# Patient Record
Sex: MALE | Race: WHITE
[De-identification: names, ages, dates, MRNs, and addresses within clinical notes are randomized per-mention and may not be internally consistent; named-entity substitution may affect disease eponyms.]

---

## 2019-12-19 ENCOUNTER — HOSPITAL ENCOUNTER (OUTPATIENT)
Dept: HOSPITAL 95 - ER | Age: 79
Setting detail: OBSERVATION
LOS: 1 days | Discharge: HOME | End: 2019-12-20
Attending: HOSPITALIST | Admitting: HOSPITALIST
Payer: MEDICARE

## 2019-12-19 VITALS — HEIGHT: 70 IN | WEIGHT: 236.78 LBS | BODY MASS INDEX: 33.9 KG/M2

## 2019-12-19 DIAGNOSIS — K21.9: ICD-10-CM

## 2019-12-19 DIAGNOSIS — Z87.891: ICD-10-CM

## 2019-12-19 DIAGNOSIS — Z90.49: ICD-10-CM

## 2019-12-19 DIAGNOSIS — Z96.653: ICD-10-CM

## 2019-12-19 DIAGNOSIS — N18.3: ICD-10-CM

## 2019-12-19 DIAGNOSIS — Z79.899: ICD-10-CM

## 2019-12-19 DIAGNOSIS — E78.5: ICD-10-CM

## 2019-12-19 DIAGNOSIS — I48.92: ICD-10-CM

## 2019-12-19 DIAGNOSIS — I50.32: ICD-10-CM

## 2019-12-19 DIAGNOSIS — R07.89: Primary | ICD-10-CM

## 2019-12-19 LAB
ALBUMIN SERPL BCP-MCNC: 3.8 G/DL (ref 3.4–5)
ALBUMIN/GLOB SERPL: 1 {RATIO} (ref 0.8–1.8)
ALT SERPL W P-5'-P-CCNC: 31 U/L (ref 12–78)
ANION GAP SERPL CALCULATED.4IONS-SCNC: 6 MMOL/L (ref 6–16)
AST SERPL W P-5'-P-CCNC: 27 U/L (ref 12–37)
BASOPHILS # BLD AUTO: 0.04 K/MM3 (ref 0–0.23)
BASOPHILS NFR BLD AUTO: 1 % (ref 0–2)
BILIRUB SERPL-MCNC: 0.9 MG/DL (ref 0.1–1)
BUN SERPL-MCNC: 32 MG/DL (ref 8–24)
CALCIUM SERPL-MCNC: 9.3 MG/DL (ref 8.5–10.1)
CHLORIDE SERPL-SCNC: 107 MMOL/L (ref 98–108)
CO2 SERPL-SCNC: 27 MMOL/L (ref 21–32)
CREAT SERPL-MCNC: 1.68 MG/DL (ref 0.6–1.2)
DEPRECATED RDW RBC AUTO: 50.5 FL (ref 35.1–46.3)
EOSINOPHIL # BLD AUTO: 0.31 K/MM3 (ref 0–0.68)
EOSINOPHIL NFR BLD AUTO: 5 % (ref 0–6)
ERYTHROCYTE [DISTWIDTH] IN BLOOD BY AUTOMATED COUNT: 15.7 % (ref 11.7–14.2)
GLOBULIN SER CALC-MCNC: 3.8 G/DL (ref 2.2–4)
GLUCOSE SERPL-MCNC: 96 MG/DL (ref 70–99)
HCT VFR BLD AUTO: 49.2 % (ref 37–53)
HGB BLD-MCNC: 15.5 G/DL (ref 13.5–17.5)
IMM GRANULOCYTES # BLD AUTO: 0.02 K/MM3 (ref 0–0.1)
IMM GRANULOCYTES NFR BLD AUTO: 0 % (ref 0–1)
LYMPHOCYTES # BLD AUTO: 1.09 K/MM3 (ref 0.84–5.2)
LYMPHOCYTES NFR BLD AUTO: 16 % (ref 21–46)
MCHC RBC AUTO-ENTMCNC: 31.5 G/DL (ref 31.5–36.5)
MCV RBC AUTO: 88 FL (ref 80–100)
MONOCYTES # BLD AUTO: 0.55 K/MM3 (ref 0.16–1.47)
MONOCYTES NFR BLD AUTO: 8 % (ref 4–13)
NEUTROPHILS # BLD AUTO: 4.85 K/MM3 (ref 1.96–9.15)
NEUTROPHILS NFR BLD AUTO: 71 % (ref 41–73)
NRBC # BLD AUTO: 0 K/MM3 (ref 0–0.02)
NRBC BLD AUTO-RTO: 0 /100 WBC (ref 0–0.2)
PLATELET # BLD AUTO: 154 K/MM3 (ref 150–400)
POTASSIUM SERPL-SCNC: 4.9 MMOL/L (ref 3.5–5.5)
PROT SERPL-MCNC: 7.6 G/DL (ref 6.4–8.2)
SODIUM SERPL-SCNC: 140 MMOL/L (ref 136–145)
TROPONIN I SERPL-MCNC: 0.02 NG/ML (ref 0–0.04)

## 2019-12-19 PROCEDURE — G0378 HOSPITAL OBSERVATION PER HR: HCPCS

## 2019-12-19 NOTE — NUR
1930 PT RESTING COMFORTABLY IN BED; DENIES CHEST PAIN OR SOB; ALERT AND
ORIENTED X 4; PT TAUGHT TO CALL THIS NURSE ANY CHEST PAIN, HEADACHE, SOB OR
ANYTHING JUST DOESN'T FEEL RIGHT ASAP WITH ACKNOWLEDGEMENT NOTED.

## 2019-12-19 NOTE — NUR
PT ADMITTED TODAY FOR CP. PATIENT HAS NO COMPLAINTS OF PAIN, SOB, OR NV. HE IS
A/O X 4 AND INDEPENDENT IN THE ROOM. HE IS ABLE TO EXPRESS ANY NEEDS. TELE
REPORTS FREQUENT PVC'S , RIGHT BBB, AND PAC'S. DOCTOR NOTIFIED. PATIENT IS IN
NO DISTRESS. HE IS RESTING IN BED AT THIS TIME.

## 2019-12-20 NOTE — NUR
PT TO DISCHARGE HOME. NO NEW MEDS. NURSE INSTRUCTED PATIENT AND WIFE TO FOLLOW
UP WITH PCP. PATIENT HAD IV REMOVED, NO SS OF INFECTION NOTED. PATIENT TO
DRESS SELF AND WILL BE WALKED OUT BY STAFF.

## 2019-12-20 NOTE — NUR
SHIFT SUMMARY:
80 Y/O MALE RESTED COMFORTABLY ALL SHIFT; DENIES CHEST PAIN, NAUSEA OR
DYSPNEA; TELEMETRY REFLECTS NSR WITH OCCASIONAL PVC/PAC WITH HEART RATE 66 PER
SHYAM--TELE TECH; ALERT AND ORIENTED X 4; LAST TROPONIN .037 AT 0137; BED
LOW POSITION WITH CALL LIGHT AT SIDE.

## 2021-08-03 ENCOUNTER — HOSPITAL ENCOUNTER (EMERGENCY)
Dept: HOSPITAL 95 - ER | Age: 81
Discharge: HOME | End: 2021-08-03
Payer: MEDICARE

## 2021-08-03 VITALS — BODY MASS INDEX: 32.21 KG/M2 | WEIGHT: 225 LBS | HEIGHT: 70 IN

## 2021-08-03 DIAGNOSIS — I48.92: ICD-10-CM

## 2021-08-03 DIAGNOSIS — I49.3: ICD-10-CM

## 2021-08-03 DIAGNOSIS — Z87.891: ICD-10-CM

## 2021-08-03 DIAGNOSIS — E78.5: ICD-10-CM

## 2021-08-03 DIAGNOSIS — R79.1: Primary | ICD-10-CM

## 2021-08-03 LAB
ALBUMIN SERPL BCP-MCNC: 3.6 G/DL (ref 3.4–5)
ALBUMIN/GLOB SERPL: 0.9 {RATIO} (ref 0.8–1.8)
ALT SERPL W P-5'-P-CCNC: 28 U/L (ref 12–78)
ANION GAP SERPL CALCULATED.4IONS-SCNC: 4 MMOL/L (ref 6–16)
AST SERPL W P-5'-P-CCNC: 27 U/L (ref 12–37)
BASOPHILS # BLD AUTO: 0.06 K/MM3 (ref 0–0.23)
BASOPHILS NFR BLD AUTO: 1 % (ref 0–2)
BILIRUB SERPL-MCNC: 1.1 MG/DL (ref 0.1–1)
BUN SERPL-MCNC: 20 MG/DL (ref 8–24)
CALCIUM SERPL-MCNC: 9 MG/DL (ref 8.5–10.1)
CHLORIDE SERPL-SCNC: 107 MMOL/L (ref 98–108)
CO2 SERPL-SCNC: 28 MMOL/L (ref 21–32)
CREAT SERPL-MCNC: 1.83 MG/DL (ref 0.6–1.2)
DEPRECATED RDW RBC AUTO: 60 FL (ref 35.1–46.3)
EOSINOPHIL # BLD AUTO: 0.21 K/MM3 (ref 0–0.68)
EOSINOPHIL NFR BLD AUTO: 4 % (ref 0–6)
ERYTHROCYTE [DISTWIDTH] IN BLOOD BY AUTOMATED COUNT: 20 % (ref 11.7–14.2)
GLOBULIN SER CALC-MCNC: 3.9 G/DL (ref 2.2–4)
GLUCOSE SERPL-MCNC: 87 MG/DL (ref 70–99)
HCT VFR BLD AUTO: 45.3 % (ref 37–53)
HGB BLD-MCNC: 13.9 G/DL (ref 13.5–17.5)
IMM GRANULOCYTES # BLD AUTO: 0.01 K/MM3 (ref 0–0.1)
IMM GRANULOCYTES NFR BLD AUTO: 0 % (ref 0–1)
LYMPHOCYTES # BLD AUTO: 1.24 K/MM3 (ref 0.84–5.2)
LYMPHOCYTES NFR BLD AUTO: 21 % (ref 21–46)
MAGNESIUM SERPL-MCNC: 2.1 MG/DL (ref 1.6–2.4)
MCHC RBC AUTO-ENTMCNC: 30.7 G/DL (ref 31.5–36.5)
MCV RBC AUTO: 84 FL (ref 80–100)
MONOCYTES # BLD AUTO: 0.58 K/MM3 (ref 0.16–1.47)
MONOCYTES NFR BLD AUTO: 10 % (ref 4–13)
NEUTROPHILS # BLD AUTO: 3.8 K/MM3 (ref 1.96–9.15)
NEUTROPHILS NFR BLD AUTO: 64 % (ref 41–73)
NRBC # BLD AUTO: 0 K/MM3 (ref 0–0.02)
NRBC BLD AUTO-RTO: 0 /100 WBC (ref 0–0.2)
PHOSPHATE SERPL-MCNC: 3.6 MG/DL (ref 2.5–4.9)
PLATELET # BLD AUTO: 163 K/MM3 (ref 150–400)
POTASSIUM SERPL-SCNC: 4.3 MMOL/L (ref 3.5–5.5)
PROT SERPL-MCNC: 7.5 G/DL (ref 6.4–8.2)
PROTHROMBIN TIME: >90 SEC (ref 9.7–11.5)
SODIUM SERPL-SCNC: 139 MMOL/L (ref 136–145)
TSH SERPL DL<=0.005 MIU/L-ACNC: 3.17 UIU/ML (ref 0.36–4.8)

## 2021-08-03 PROCEDURE — A9270 NON-COVERED ITEM OR SERVICE: HCPCS

## 2022-04-12 ENCOUNTER — HOSPITAL ENCOUNTER (OUTPATIENT)
Dept: HOSPITAL 95 - ORSCSDS | Age: 82
Discharge: HOME | End: 2022-04-12
Attending: OPHTHALMOLOGY
Payer: MEDICARE

## 2022-04-12 VITALS — WEIGHT: 223.11 LBS | HEIGHT: 70 IN | BODY MASS INDEX: 31.94 KG/M2

## 2022-04-12 DIAGNOSIS — E07.9: ICD-10-CM

## 2022-04-12 DIAGNOSIS — Z79.01: ICD-10-CM

## 2022-04-12 DIAGNOSIS — I12.9: ICD-10-CM

## 2022-04-12 DIAGNOSIS — Z79.899: ICD-10-CM

## 2022-04-12 DIAGNOSIS — N18.9: ICD-10-CM

## 2022-04-12 DIAGNOSIS — Z79.82: ICD-10-CM

## 2022-04-12 DIAGNOSIS — H25.11: Primary | ICD-10-CM

## 2022-04-12 PROCEDURE — V2632 POST CHMBR INTRAOCULAR LENS: HCPCS

## 2022-04-12 PROCEDURE — 08RJ3JZ REPLACEMENT OF RIGHT LENS WITH SYNTHETIC SUBSTITUTE, PERCUTANEOUS APPROACH: ICD-10-PCS | Performed by: OPHTHALMOLOGY

## 2022-04-21 ENCOUNTER — HOSPITAL ENCOUNTER (OUTPATIENT)
Dept: HOSPITAL 95 - ORSCSDS | Age: 82
Discharge: HOME | End: 2022-04-21
Attending: OPHTHALMOLOGY
Payer: MEDICARE

## 2022-04-21 VITALS — BODY MASS INDEX: 32.16 KG/M2 | HEIGHT: 70 IN | WEIGHT: 224.65 LBS

## 2022-04-21 DIAGNOSIS — H25.12: Primary | ICD-10-CM

## 2022-04-21 DIAGNOSIS — E03.9: ICD-10-CM

## 2022-04-21 DIAGNOSIS — R06.02: ICD-10-CM

## 2022-04-21 DIAGNOSIS — K21.9: ICD-10-CM

## 2022-04-21 DIAGNOSIS — E66.9: ICD-10-CM

## 2022-04-21 DIAGNOSIS — Z79.899: ICD-10-CM

## 2022-04-21 DIAGNOSIS — I48.91: ICD-10-CM

## 2022-04-21 DIAGNOSIS — Z79.01: ICD-10-CM

## 2022-04-21 DIAGNOSIS — I10: ICD-10-CM

## 2022-04-21 PROCEDURE — 08RK3JZ REPLACEMENT OF LEFT LENS WITH SYNTHETIC SUBSTITUTE, PERCUTANEOUS APPROACH: ICD-10-PCS | Performed by: OPHTHALMOLOGY

## 2022-04-21 PROCEDURE — V2632 POST CHMBR INTRAOCULAR LENS: HCPCS

## 2023-01-03 ENCOUNTER — HOSPITAL ENCOUNTER (INPATIENT)
Dept: HOSPITAL 95 - ER | Age: 83
LOS: 13 days | Discharge: HOME HEALTH | DRG: 871 | End: 2023-01-16
Attending: HOSPITALIST | Admitting: HOSPITALIST
Payer: MEDICARE

## 2023-01-03 VITALS — WEIGHT: 199.74 LBS | HEIGHT: 70 IN | BODY MASS INDEX: 28.59 KG/M2

## 2023-01-03 DIAGNOSIS — E80.6: ICD-10-CM

## 2023-01-03 DIAGNOSIS — Z20.822: ICD-10-CM

## 2023-01-03 DIAGNOSIS — E86.0: ICD-10-CM

## 2023-01-03 DIAGNOSIS — E87.20: ICD-10-CM

## 2023-01-03 DIAGNOSIS — J96.01: ICD-10-CM

## 2023-01-03 DIAGNOSIS — R65.20: ICD-10-CM

## 2023-01-03 DIAGNOSIS — J10.01: ICD-10-CM

## 2023-01-03 DIAGNOSIS — G92.8: ICD-10-CM

## 2023-01-03 DIAGNOSIS — Z79.899: ICD-10-CM

## 2023-01-03 DIAGNOSIS — Z79.890: ICD-10-CM

## 2023-01-03 DIAGNOSIS — I48.92: ICD-10-CM

## 2023-01-03 DIAGNOSIS — E87.6: ICD-10-CM

## 2023-01-03 DIAGNOSIS — J69.0: ICD-10-CM

## 2023-01-03 DIAGNOSIS — N17.9: ICD-10-CM

## 2023-01-03 DIAGNOSIS — E03.9: ICD-10-CM

## 2023-01-03 DIAGNOSIS — E87.0: ICD-10-CM

## 2023-01-03 DIAGNOSIS — E78.5: ICD-10-CM

## 2023-01-03 DIAGNOSIS — K21.9: ICD-10-CM

## 2023-01-03 DIAGNOSIS — Z98.890: ICD-10-CM

## 2023-01-03 DIAGNOSIS — Z96.653: ICD-10-CM

## 2023-01-03 DIAGNOSIS — I50.32: ICD-10-CM

## 2023-01-03 DIAGNOSIS — Z79.01: ICD-10-CM

## 2023-01-03 DIAGNOSIS — A41.9: Primary | ICD-10-CM

## 2023-01-03 DIAGNOSIS — I21.A1: ICD-10-CM

## 2023-01-03 DIAGNOSIS — N18.31: ICD-10-CM

## 2023-01-03 LAB
ALBUMIN SERPL BCP-MCNC: 2.6 G/DL (ref 3.4–5)
ALBUMIN/GLOB SERPL: 0.5 {RATIO} (ref 0.8–1.8)
ALT SERPL W P-5'-P-CCNC: 51 U/L (ref 12–78)
ANION GAP SERPL CALCULATED.4IONS-SCNC: 12 MMOL/L (ref 6–16)
AST SERPL W P-5'-P-CCNC: 77 U/L (ref 12–37)
BASOPHILS # BLD: 0 K/MM3 (ref 0–0.23)
BASOPHILS NFR BLD: 0 % (ref 0–2)
BILIRUB DIRECT SERPL-MCNC: 2.2 MG/DL (ref 0–0.3)
BILIRUB INDIRECT SERPL-MCNC: 1.2 MG/DL (ref 0.1–0.7)
BILIRUB SERPL-MCNC: 3.4 MG/DL (ref 0.1–1)
BILIRUB SERPL-MCNC: 3.8 MG/DL (ref 0.1–1)
BUN SERPL-MCNC: 43 MG/DL (ref 8–24)
CALCIUM SERPL-MCNC: 9.6 MG/DL (ref 8.5–10.1)
CHLORIDE SERPL-SCNC: 95 MMOL/L (ref 98–108)
CO2 SERPL-SCNC: 24 MMOL/L (ref 21–32)
CREAT SERPL-MCNC: 1.61 MG/DL (ref 0.6–1.2)
DEPRECATED RDW RBC AUTO: 56.1 FL (ref 35.1–46.3)
EOSINOPHIL # BLD: 0 K/MM3 (ref 0–0.68)
EOSINOPHIL NFR BLD: 0 % (ref 0–6)
ERYTHROCYTE [DISTWIDTH] IN BLOOD BY AUTOMATED COUNT: 18.4 % (ref 11.7–14.2)
FLUAV RNA SPEC QL NAA+PROBE: POSITIVE
FLUBV RNA SPEC QL NAA+PROBE: NEGATIVE
GLOBULIN SER CALC-MCNC: 5.6 G/DL (ref 2.2–4)
GLUCOSE SERPL-MCNC: 117 MG/DL (ref 70–99)
HCT VFR BLD AUTO: 48.2 % (ref 37–53)
HGB BLD-MCNC: 15.8 G/DL (ref 13.5–17.5)
LYMPHOCYTES # BLD: 0.97 K/MM3 (ref 0.84–5.2)
LYMPHOCYTES NFR BLD: 15 % (ref 21–46)
MCHC RBC AUTO-ENTMCNC: 32.8 G/DL (ref 31.5–36.5)
MCV RBC AUTO: 86 FL (ref 80–100)
MONOCYTES # BLD: 0.39 K/MM3 (ref 0.16–1.47)
MONOCYTES NFR BLD: 6 % (ref 4–13)
NEUTS BAND NFR BLD MANUAL: 5 % (ref 0–8)
NEUTS SEG # BLD MANUAL: 5.15 K/MM3 (ref 1.96–9.15)
NEUTS SEG NFR BLD MANUAL: 74 % (ref 41–73)
NRBC # BLD AUTO: 0.02 K/MM3 (ref 0–0.02)
NRBC BLD AUTO-RTO: 0.3 /100 WBC (ref 0–0.2)
PH BLDA: 7.51 [PH] (ref 7.35–7.45)
PLATELET # BLD AUTO: 227 K/MM3 (ref 150–400)
POTASSIUM SERPL-SCNC: 3.8 MMOL/L (ref 3.5–5.5)
PROT SERPL-MCNC: 8.2 G/DL (ref 6.4–8.2)
PROTHROMBIN TIME: 17.6 SEC (ref 9.7–11.5)
RSV RNA SPEC QL NAA+PROBE: NEGATIVE
SARS-COV-2 RNA RESP QL NAA+PROBE: NEGATIVE
SODIUM SERPL-SCNC: 131 MMOL/L (ref 136–145)
TOTAL CELLS COUNTED BLD: 100

## 2023-01-03 PROCEDURE — A9270 NON-COVERED ITEM OR SERVICE: HCPCS

## 2023-01-03 NOTE — NUR
PHYSICIAN NOTIFIED
PT CONT TO ATTEMPT TO CLIMB OUT OF BED. CONFUSED AND PULLING AT LINES AND 02
TUBING. ORDERS FOR ONE TIME DOSE OF SEOQUEL 25 MG NOW.

## 2023-01-03 NOTE — NUR
ADDMISSION SUMMARY
 
PT HAS BEEN RESTING IN BED SINCE ARRIVAL. PT ARRIVED TO THE DEPARTMENT BY
GURNEY, THEY WERE TRANSFERRED BY A MODERATE 2-PERSON PIVOT TRANSFER, THEY WERE
UNSTEADY ON THEIR FEET AND DID NOT FOLLOW INSTRUCTION WELL. PT IS ORIENTED TO
SELF ONLY, MULTIPLE ATTEMPTS TO REORIENT WERE MADE UNSUCCESSFULLY. PT WAS
PLEASANT AND COOPERATIVE, THEY ARE BEING VISUALLY MONITORED ON CENTRAL
MONITORING. NORMAL SALINE BOLUS WAS COMPLETED AND MAINTENANCE IV FLUIDS WERE
STARTED.

## 2023-01-04 LAB
ANION GAP SERPL CALCULATED.4IONS-SCNC: 7 MMOL/L (ref 6–16)
BUN SERPL-MCNC: 38 MG/DL (ref 8–24)
CALCIUM SERPL-MCNC: 8.6 MG/DL (ref 8.5–10.1)
CHLORIDE SERPL-SCNC: 102 MMOL/L (ref 98–108)
CO2 SERPL-SCNC: 27 MMOL/L (ref 21–32)
CREAT SERPL-MCNC: 1.27 MG/DL (ref 0.6–1.2)
DEPRECATED RDW RBC AUTO: 54.4 FL (ref 35.1–46.3)
ERYTHROCYTE [DISTWIDTH] IN BLOOD BY AUTOMATED COUNT: 17.4 % (ref 11.7–14.2)
GLUCOSE SERPL-MCNC: 91 MG/DL (ref 70–99)
HCT VFR BLD AUTO: 42.7 % (ref 37–53)
HGB BLD-MCNC: 14 G/DL (ref 13.5–17.5)
MAGNESIUM SERPL-MCNC: 2.2 MG/DL (ref 1.6–2.4)
MCHC RBC AUTO-ENTMCNC: 32.8 G/DL (ref 31.5–36.5)
MCV RBC AUTO: 85 FL (ref 80–100)
NRBC # BLD AUTO: 0 K/MM3 (ref 0–0.02)
NRBC BLD AUTO-RTO: 0 /100 WBC (ref 0–0.2)
PLATELET # BLD AUTO: 169 K/MM3 (ref 150–400)
POTASSIUM SERPL-SCNC: 4.3 MMOL/L (ref 3.5–5.5)
PROTHROMBIN TIME: 20.1 SEC (ref 9.7–11.5)
SODIUM SERPL-SCNC: 136 MMOL/L (ref 136–145)

## 2023-01-04 NOTE — NUR
PT SUMMARY:
 
PT REMAINS CONFUSED AND AGITATED AT THE BEGINNING OF THE SHIFT, PULLING ON
LINES AND TUBINGS, UNABLE TO FOLLOW DIRECTIONS. VITALS HRR AFIB 90'S, SBP
120'S. SATS ABOVE 90% ON 4-6L OF O2, PT DESATS WITH EXERTION, HAS WET COUGH,
BREATHING TX PER RT, AFEBRILE. PT WORKED WITH PT TODAY PT SAT UP ON THE SIDE
OF THE BED PT STILL UNABLE TO KEEP TRACK OF INSTRUCTIONS AND DESATS WITH
ACTIVITY. PT SELPT MOST OF THE SHIFT AFTER BED BATH WAS GIVEN, CONDOM CATH
STILL IN PLACE DRAINING YELLOW URINE VIA GRAVITY, PT PULLED THE CONDOM CATH
TWICE AND WAS REPLACED. PT EATING AND DRINKING INDEPENDENTLY. NO OTHER ISSUES
ENCOUNTERED SON AND DAUGHTER CALLED AND WAS GIVEN UPDATE REGARDING PT'S
STATUS. PT NOW RESTING IN BED CALL LIGHTS IN REACH WILL REPORT TO ONCOMING
SHIFT

## 2023-01-04 NOTE — NUR
SHIFT SUMMARY
PT ALERT TO SELF. CONFUSED AND ATTEMPTING TO PULL AT LINES AND GET OUT OF BED.
PHYSICIAN NOTIFIED AND ORDERS FOR 1 MG OF ATIVAN GIVEN. PT RESTING AFTER
ATIVAN GIVEN. PT CONT TO HAVE NONSENSICAL SPEECH T/O SHIFT. BED ALARM IN
PLACE. HR STABLE. BP STABLE. OXYGEN SATURATION MAINTAINED ABOVE 92% ON 6 L VIA
NC. NO CP OR PRESSURE REPORTED. CALL LIGHT WITHIN REACH. PT HAS CONDOM CATH IN
PLACE D/T URINARY INCONTINENCE. DRAINING TO GRAVITY. PT NAUNENBETITO ON CAMERA.
WILL CONT TO MONITOR UNTIL REPORT GIVEN TO KINA RN.

## 2023-01-04 NOTE — NUR
ASSUMPTION OF CARE
 
THIS RN ASSUMED CARE OF PATIENT AT 1900. REPORT TAKEN FROM LUIS ANGEL RN. PATIENT IS
ALERT AND ORIENTED TO SELF ONLY. PATIENT APPEARS TO BE HALLUCINATING AND
REACHING OUT TO GRAB THE AIR. PATIENT ATTEMPTED TO GET OUT OF BED AFTER SHIFT
CHANGE. PATIENT DESATS EASILY WITH ACTIVITY. O2 INCREASED FROM 4L TO 8L AT
THIS TIME; THIS RN WILL TITRATE AS NEEDED FOR SPO2 >92%. BP STABLE. AFEBRILE.
AFIB ON MONITOR WITH HR 'S. PATIENT WITH SIDE RAILS UP, BED ALARM ON,
AND ON CAMERA. PATIENT MEDICATED PER EMAR. PATIENT WAS ABLE TO USE URINAL WITH
THIS RN AFTER STATING THAT HE "NEEDED TO PEE". URINE NOTED TO BE LILLIE IN
COLOR. BED IN LOWEST POSITION AND CALL LIGHT WITHIN REACH. THIS RN WILL
CONTINUE TO MONITOR CLOSELY FOR SAFETY AND PROVIDE INTERVENTIONS AS
NEEDED/ORDERED.

## 2023-01-05 LAB
ALBUMIN SERPL BCP-MCNC: 1.8 G/DL (ref 3.4–5)
ALBUMIN/GLOB SERPL: 0.4 {RATIO} (ref 0.8–1.8)
ALT SERPL W P-5'-P-CCNC: 35 U/L (ref 12–78)
ANION GAP SERPL CALCULATED.4IONS-SCNC: 6 MMOL/L (ref 6–16)
AST SERPL W P-5'-P-CCNC: 46 U/L (ref 12–37)
BASOPHILS # BLD: 0 K/MM3 (ref 0–0.23)
BASOPHILS NFR BLD: 0 % (ref 0–2)
BILIRUB SERPL-MCNC: 2.8 MG/DL (ref 0.1–1)
BUN SERPL-MCNC: 32 MG/DL (ref 8–24)
CALCIUM SERPL-MCNC: 8.3 MG/DL (ref 8.5–10.1)
CHLORIDE SERPL-SCNC: 106 MMOL/L (ref 98–108)
CO2 SERPL-SCNC: 25 MMOL/L (ref 21–32)
CREAT SERPL-MCNC: 1.13 MG/DL (ref 0.6–1.2)
DEPRECATED RDW RBC AUTO: 55.8 FL (ref 35.1–46.3)
EOSINOPHIL # BLD: 0.06 K/MM3 (ref 0–0.68)
EOSINOPHIL NFR BLD: 1 % (ref 0–6)
ERYTHROCYTE [DISTWIDTH] IN BLOOD BY AUTOMATED COUNT: 17.7 % (ref 11.7–14.2)
GLOBULIN SER CALC-MCNC: 4.7 G/DL (ref 2.2–4)
GLUCOSE SERPL-MCNC: 91 MG/DL (ref 70–99)
HCT VFR BLD AUTO: 40.3 % (ref 37–53)
HGB BLD-MCNC: 13.2 G/DL (ref 13.5–17.5)
LYMPHOCYTES # BLD: 0.61 K/MM3 (ref 0.84–5.2)
LYMPHOCYTES NFR BLD: 10 % (ref 21–46)
MCHC RBC AUTO-ENTMCNC: 32.8 G/DL (ref 31.5–36.5)
MCV RBC AUTO: 86 FL (ref 80–100)
METAMYELOCYTES # BLD MANUAL: 0.12 K/MM3 (ref 0–0)
METAMYELOCYTES NFR BLD MANUAL: 2 % (ref 0–0)
MONOCYTES # BLD: 0.12 K/MM3 (ref 0.16–1.47)
MONOCYTES NFR BLD: 2 % (ref 4–13)
NEUTS BAND NFR BLD MANUAL: 4 % (ref 0–8)
NEUTS SEG # BLD MANUAL: 5.2 K/MM3 (ref 1.96–9.15)
NEUTS SEG NFR BLD MANUAL: 81 % (ref 41–73)
NRBC # BLD AUTO: 0.02 K/MM3 (ref 0–0.02)
NRBC BLD AUTO-RTO: 0.3 /100 WBC (ref 0–0.2)
PH BLDV: 7.47 [PH] (ref 7.34–7.37)
PLATELET # BLD AUTO: 188 K/MM3 (ref 150–400)
POTASSIUM SERPL-SCNC: 3.4 MMOL/L (ref 3.5–5.5)
PROT SERPL-MCNC: 6.5 G/DL (ref 6.4–8.2)
PROTHROMBIN TIME: 26.5 SEC (ref 9.7–11.5)
SODIUM SERPL-SCNC: 137 MMOL/L (ref 136–145)
TOTAL CELLS COUNTED BLD: 100

## 2023-01-05 NOTE — NUR
PT INFORMED OF MEDS AND OTHER CARE BEFORE THEY WERE GIVEN OR PERFORMED. PT
COOPERATIVE BUT DOES NOT APPEAR TO FULLY UNDERSTAND THE MEDS AND CARE WHEN
GIVEN AND PERFORMED.

## 2023-01-05 NOTE — NUR
CENTRAL MONITOR CONTACTED THIS RN STATING THAT PT IS LOOKING AS THOUGH HE IS
CLIMBING OUT OF BED. THIS RN ENTERED ROOM, PT HAD HIS BLANKETS OFF AND GOWN
TAKEN OFF. PT GRABBING AT HIS GROIN AREA. PT ASKED IF HE HAS ANY PAIN IN HIS
GROIN, PT SAID "NO" SLOWLY. WHEN PALPATING PT LOWER ABD, NO TENDERNESS NOTED.
PT REGOWNED AND BOOSTED IN BED. PT WAS ABLE TO FOLLOW INSTRUCTION OF CHARGE RN
TO OPEN HIS EYES. BED IN LOWEST POSITION. CALL LIGHT WITHIN REACH.

## 2023-01-05 NOTE — NUR
UPDATE
NOON VITALS, PT SLEEPING WITH O2 SATS 94 ON 8L HFNC. PT O2 TITIRATED TO 5L,
SATS DOWN TO 88%, TITRATED TO 7L. PT PULLED HIS ARM AWAY FROM THIS RN AS BP
CUFF WAS ATTEMPTING TO BE APPLIED. PT INFORMED THAT VS NEED TO BE DONE, PT
COOPERATIVE. PT MOANING NONSENSICAL SPEECH.

## 2023-01-05 NOTE — NUR
SHIFT SUMMARY
PT ORIENTED TO SELF ONLY. PT LETHARGIC FOR ENTIRE SHIFT, BRIEFLY TRIES TO OPEN
EYES AND FOLLOW INSTRUCTIONS WHEN PROMPTED. PT HR SR-ST THROUGHOUT SHIFT
RANGING 'S. PT TACHYPNEIC AND ON 6-9L HFNC, TITRATING O2 PRN. PT HAS
PULLED HIS OXYGEN MULTIPLE TIMES DURING SHIFT, SATS WOULD DROP TO LOW 80'S. PT
REORIENTED EASILY.  SATS WOULD RETURN SLOWLY TO 90'S ONCE OXYGEN WAS
RE-APPLIED AND INCREASED TO 9L.  OTHER VSS THROUGHOUT SHIFT. NO REPORT OF
CHEST PAIN/PRESSURE THROUGHOUT SHIFT.  PT INCONTINENT OF URINE, ATTENDS
CHANGED MULITPLE TIMES DURING SHIFT. PT ABLE TO TAKE PO MEDS THIS MORNING AND
EAT SMALL PORTIONS OF HIS MEALS. PT BEGAN COUGHING ON HIS WATER DURING
EVENING MEDS, PT DINNER TRAY HELD. SPEECH THERAPY ORDERED FOR SWALLOW EVAL. PT
ASSISTS WITH TURNING SELF IN BED WHEN PROMPTED.

## 2023-01-05 NOTE — NUR
SHIFT SUMMARY
 
PATIENT HAS INCREASED OXYGEN DEMANDS THROUGHOUT THIS SHIFT. PATIENT CHANGED TO
HI-FLOW NC WITH O2 8-12L. PATIENT DESATS QUICKLY WHEN HE REMOVES O2 FROM NOSE
OR WITH ANY ACTIVITY. PATIENT SLOW TO RECOVER AND REQUIRED O2 TO BE AT 15L
SEVERAL TIMES AFTER DESATTING TO THE LOW 80'S. TACHYPNEA NOTED. RT CONSULTED
ON SEVERAL OCCASIONS DUE TO PATIENT'S INCREASED O2 DEMANDS. LS COARSE
THROUGHOUT. PATIENT WITH WEAK, MOIST, NONPRODUCTIVE COUGH. PATIENT ENCOURAGED
THROUGHOUT THIS SHIFT TO COUGH AND CLEAR SECRETIONS HOWEVER PATIENT IS NOT
FOLLOWING COMMANDS WELL AND HAS NOT ALLOWED THIS RN TO SUCTION MOUTH TO ASSIST
WITH CLEARING OF SECRETIONS. ON 8L O2 AT THIS TIME WITH SPO2 >90%. TITRATING
O2 AS NEEDED TO MAINTAIN O2 SATS >90%. BP STABLE. AFEBRILE.
PATIENT ALERT/LETHARGIC AT TIMES AND CONTINUES TO ONLY BE ORIENTED TO SELF.
PATIENT HAS BEEN MOSTLY COOPERATIVE WITH CARE. PATIENT CONTINUES TO REACH OUT
AND APPEARS TO HAVE VISUAL HALLUCINATIONS WITH SPEECH THAT IS MUMBLED AND
INCOMPREHENSIBLE. PATIENT CONTINENT/INCONTINENT. CALL LIGHT WITHIN REACH. BED
IN LOWEST POSITION, BED ALARM ON, CAMERA ON IN ROOM. THIS RN WILL CONTINUE TO
MONITOR CLOSELY UNTIL SHIFT CHANGE AT 0700.

## 2023-01-05 NOTE — NUR
ASSUMPTION OF CARE
 
THIS RN ASSUMED CARE OF PATIENT AT 1900. REPORT TAKEN FROM THELMA COTTER. PATIENT
CONTINUES TO ONLY BE ORIENTED TO SELF. LETHARGIC AT TIMES. COOPERATIVE WITH
CARE. TALKS ABOUT PEOPLE IN HIS ROOM AND REACHES OUT. ON 9L HFNC WITH SPO2
>90% AT THIS TIME; TITRATING AS NEEDED TO MAINTAIN O2 SATS. PATIENT WITH
TACHYPNEA WITH RR 26-30. BP STABLE. AFIB ON MONITOR WITH -110'S AT THIS
TIME. PATIENT DENIES ALL PAIN. BED ALARM AND CAMERA ON FOR SAFETY. PATIENT
FREQUENTLY ATTEMPTS TO REMOVE NASAL CANNULA, UNDRESSES SELF, AND TO GET OUT OF
BED. GENERALIZED WEAKNESS NOTED. LS COARSE T/O. PATIENT DESATS QUICKLY WITH
ACTIVITY OR WHEN NASAL CANNULA REMOVED FROM NOSE. NPO AT THIS TIME DUE TO
ASPIRATION RISK AND INABILITY TO FOLLOW COMMANDS. BED IN LOWEST POSITION AND
CALL LIGHT WITHIN REACH. THIS RN WILL REVIEW CHART AND CONTINUE TO MONITOR AND
PROVIDE INTERVENTIONS AS NEEDED/ORDERED UNTIL SHIFT CHANGE.

## 2023-01-06 LAB
ALBUMIN SERPL BCP-MCNC: 1.9 G/DL (ref 3.4–5)
ALBUMIN/GLOB SERPL: 0.4 {RATIO} (ref 0.8–1.8)
ALT SERPL W P-5'-P-CCNC: 30 U/L (ref 12–78)
ANION GAP SERPL CALCULATED.4IONS-SCNC: 7 MMOL/L (ref 6–16)
AST SERPL W P-5'-P-CCNC: 39 U/L (ref 12–37)
BILIRUB SERPL-MCNC: 3 MG/DL (ref 0.1–1)
BUN SERPL-MCNC: 28 MG/DL (ref 8–24)
CALCIUM SERPL-MCNC: 8.8 MG/DL (ref 8.5–10.1)
CHLORIDE SERPL-SCNC: 106 MMOL/L (ref 98–108)
CO2 SERPL-SCNC: 27 MMOL/L (ref 21–32)
CREAT SERPL-MCNC: 1.16 MG/DL (ref 0.6–1.2)
GLOBULIN SER CALC-MCNC: 5.1 G/DL (ref 2.2–4)
GLUCOSE SERPL-MCNC: 97 MG/DL (ref 70–99)
POTASSIUM SERPL-SCNC: 3.4 MMOL/L (ref 3.5–5.5)
PROT SERPL-MCNC: 7 G/DL (ref 6.4–8.2)
PROTHROMBIN TIME: 30.7 SEC (ref 9.7–11.5)
SODIUM SERPL-SCNC: 140 MMOL/L (ref 136–145)

## 2023-01-06 NOTE — NUR
DR CONTACTED ABOUT PT INCREASED OXYGEN DAMAND. OLANZAPINE ON PT EMAR
RECOMMENDED IF PT DOES NOT BEGIN TO RELAX SOME AND INSTRUCTED TO WAIT TO SEE
WHAT RT RECOMMENDS.

## 2023-01-06 NOTE — NUR
PT TOOK PO MEDS CRUSHED IN APPLE SAUCE. ZYPREXA AND COUMADIN GIVEN PER EMAR
VIA CRUSHED  AND IN APPLE SAUSCE. PT NEEDING CONSTANT CUES IN ORDER TO GIVE PO
MEDS VIA APPLE SAUCE. PT CONTUEING TO ATTEMPT TO CLIMB OUT OF BED. PT
REMINDED THAT HE IS AT [facility] AND THAT HE IS VERY ILL. PT RESPONDS "OH
I AM?" PT APPEARS TO BE HAVING HALLUCINATIONS IN ROOM, REACHING OUT FOR THINGS
NOT PRESENT AS HE MUMBLES NONSENSICAL WORDS. O2 SATS REMAIN 86-89% ON 14L
HFNC. PT HAVING WET COUGHS. appointment was previously scheduled, appointment scheduling offered: No and call already called and waiting on call back with appt. [facility] follow up appointment(s):   Future Appointments   Date Time Provider Gary Carcamo   3/14/2022  2:15 PM Camilo Duane, APRN - CNP [facility]     Non-[facility] follow up appointment(s):     Non-face-to-face services provided:  Obtained and reviewed discharge summary and/or continuity of care documents  Assessment and support for treatment adherence and medication management-reviewed     Advance Care Planning:   Does patient have an Advance Directive:  decision maker verified. Educated patient about risk for severe COVID-19 due to risk factors according to CDC guidelines. CTN reviewed discharge instructions, medical action plan and red flag symptoms with the family who verbalized understanding. Discussed COVID vaccination status: Yes and pt vaccinated. Education provided on COVID-19 vaccination as appropriate. Discussed exposure protocols and quarantine with CDC Guidelines. Family was given an opportunity to verbalize any questions and concerns and agrees to contact CTN or health care provider for questions related to their healthcare. Reviewed and educated family on any new and changed medications related to discharge diagnosis     Was patient discharged with a pulse oximeter? No       CTN provided contact information. Plan for follow-up call in 5-7 days based on severity of symptoms and risk factors.       Care Transitions 24 Hour Call    Care Transitions Interventions         Follow Up  Future Appointments   Date Time Provider Gary Carcamo   3/14/2022  2:15 PM Camilo Duane, APRN - CNP [facility]       Martina Campos RN

## 2023-01-06 NOTE — NUR
RT CONTACTED AND INFORMED THAT PT IS CURRENTL AT 14 L HFNC WITH SATS 88%. RT
INFORMED RN THAT HE IS NOT WORRIED ABOUT PT BEING 88% AT THIS TIME.
RECOMMENDED TO TRY AND LET PT SETTLE A LITTLE AND SEE IF SATS GO UP. PT
COTINUING TO PULL OFF NASAL CANULA, INSTRUCTED TO LEAVE CANULA ON.

## 2023-01-06 NOTE — NUR
SHIFT SUMMARY
 
NO CHANGES IN NEURO STATUS SINCE PREVIOUS NOTE. SEE ASSESSMENT. PATIENT
CONTINUES TO BE IN AFIB WITH BBB AND FREQUENT PVC'S. HR 'S. AFEBRILE. BP
STABLE. 6-9L HFNC WITH SPO2 >90%, TITRATING AS NEEDED TO MAINTAIN O2 SATS.
NO PO MEDS GIVEN DUE TO PATIENT NOT WAKING UP ENOUGH OR FOLLOWING
COMMANDS. PATIENT DENIES PAIN. ABLE TO MAKE BASIC NEEDS KNOWN WITH DIRECT
QUESTIONS IF NOT TOO LETHARGIC. ON CAMERA FOR SAFETY, BED ALARM ON.
INCONTINENCE WITH LILLIE COLORED URINE NOTED. BED IN LOWEST POSITION AND CALL
LIGHT IN PLACE. THIS RN WILL CONTINUE TO MONITOR UNTIL SHIFT CHANGE AT 0700.

## 2023-01-06 NOTE — NUR
UPDATE
PT SATS DOWN TO 86% ON MNITOR. THIS RN ENTERED ROOM, PT LEANING TO HIS RIGHT
IN HIS BED FIDGETING WITH TELE BOX. PT ATTENDS WERE PULLED OFF AND SITTING AT
THE FOOT OF THE PT BED. PT BOOSTED IN BED AND HOB ELVATED ALONG WITH OXYGE
TITRATING TO 11L HFNC. NEW ATTENDS IN PLACE. PT ATTEMPED TO PULL OFF ATTENDS
MULTIPLE TIMES WHILE THIS RN IS IN ROOM, PT EVENTUALLY PULLED ATTENDS OFF.
URINAL SUPPLIED FOR PT. PT CONTINUES TO FIDGET AND CLIMB OUT OF BED. PT
REQUIRING CONSTANT CUES FROM RN.

## 2023-01-06 NOTE — NUR
PATIENT UPDATE/ASSUMPTION OF CARE
 
THIS RN ASSUMED CARE OF PATIENT AT 1900. REPORT TAKEN FROM THELMA RN. AT TIME
OF SHIFT CHANGE PATIENT WAS CONTINUALLY REMOVING HFNC AND HAD TO BE PLACED ON
NON REBREATHER MASK TO MAINTAIN O2 SATS. PATIENT QUICKLY DROPPED TO 78-82%
WHEN ON RA. PATIENT NOT ABLE TO BE REORIENTED AT THAT TIME AND ONLY ORIENTED
TO SELF. THIS RN AND PREVIOUS RN PLACED LUBRICANT IN NOSE TO HELP WITH DRYNESS
FROM NASAL CANNULA, HOWEVER PATIENT DID NOT ALLOW O2 IN NOSE. THIS RN CALLED
MD QUESADA TO OBTAIN ORDER FOR BILATERAL SOFT WRIST RESTRAINTS, ORDER PLACED
AT 1942 AND RESTRAINTS INITIATED. MD WITH ORDER FOR IV ATIVAN. PATIENT WAS
GIVEN PO MEDS WITH APPLESAUCE WHILE SITTING UPRIGHT AND GIVEN WATER BEFORE
ATIVAN WAS GIVEN. PATIENT APPEARED RELAXED WITH SPO2>92% AND WAS PLACED BACK
ON HFNC. PATIENT CONTINUES TO BE ON 10L VIA HFNC WITH BILATERAL WRIST
RESTRAINTS IN PLACE. BP STABLE. SPO2 REMAINS >92%. AFEBRILE. AFIB WITH HR
'S. REPOSITIONING Q2HRS AND OFFERING FLUIDS/FOOD FREQUENTLY. PATIENT
APPEARS TO BE RESTING AT THIS TIME. INCONTINENT OF URINE WITH NOTED LILLIE
COLORED URINE. CONDOM CATHETER IN PLACE TO ALLOW FOR REST. PATIENT CONTINUES
TO ONLY BE ORIENTEED TO SELF AND CONFUSED BY CARE AND NOT ABLE TO BE
REDIRECTED/ORIENTED. BED ALARM AND CAMERA ON. DAUGHTER ERICA UPDATED ON PLAN
OF CARE AND NEED FOR RESTRAINTS, DAUGHTER VERBALIZED UNDERSTANDING AND WAS
AGREEABLE TO PLAN OF CARE AND RESTRAINTS. BED IN LOWEST POSITION AND CALL
LIGHT WITHIN REACH. THIS RN WILL REVIEW CHART AND CONTINUE TO PROVIDE
INTERVENTIONS AS NEEDED/ORDERED.

## 2023-01-06 NOTE — NUR
SHIFT SUMMARY
PT ORIENTED TO SELF THROUGHOUT SHIFT, STILL VERY CONFUSED AND LETHARGIC.  HAD
A DECENT DAY AT BEGINNING OF SHIFT FOLLOWING INSTRUCTIONS AND WORKING WITH
PT/OT. SATS REMAINED IN 90'S ON 6-9L HFNC, PT HAD MULTIPLE TIMES OF PULLING
OXYGEN OFF.  SATS DROP TO 80'S QUICKLY WHEN 0XYGEN PULLED OFF, SATS RETURN TO
90'S SLOWLY ONCE OXYGEN IS REAPPLIED. HR REMAINED A-FIB IN -110'S AND
STABLE BPS'.  NO REPORT OF CHEST PAIN/PRESSURE. PT RESP RATE TACHY THROUGHOUT
SHIFT RANGING 28-32 BPM. TOWARDS END OF SHIFT, PT BECAME RESTLESS AND BEGAN
TRYING TO CLIMB OUT OF BED. SATS IN THE 80'S AT THIS TIME, SEE PREVIOUS NOTES.
PT APPEARS TO BE HAVING HALLUCINATIONS TOWARDS END OF SHIFT. PT WAS FOLLOWING
INSTRUCTIONS FOR MAJORITY OF SHIFT, PT NEEDING MANY CUES AND REMINDERS TO GET
PT TO FOLLOW SIMPLE INSTRUCTIONS. RT AND DR INFORMED OF PT INCREASED OXYGEN
DEMAND.

## 2023-01-06 NOTE — NUR
Patient is sitting one a chair and resting. He mumbles and fades off
midsentence and so communication is strained. He does welcome prayer and
shares about his son being part of the [employer]. I gladly provide prayer
and acalming presence. Patient responded well and showed signs of being
encouraged in his pursuit to be well.

## 2023-01-07 LAB
ALBUMIN SERPL BCP-MCNC: 1.8 G/DL (ref 3.4–5)
ALBUMIN/GLOB SERPL: 0.3 {RATIO} (ref 0.8–1.8)
ALT SERPL W P-5'-P-CCNC: 26 U/L (ref 12–78)
ANION GAP SERPL CALCULATED.4IONS-SCNC: 3 MMOL/L (ref 6–16)
AST SERPL W P-5'-P-CCNC: 41 U/L (ref 12–37)
BASOPHILS # BLD AUTO: 0.06 K/MM3 (ref 0–0.23)
BASOPHILS NFR BLD AUTO: 1 % (ref 0–2)
BILIRUB SERPL-MCNC: 2.2 MG/DL (ref 0.1–1)
BUN SERPL-MCNC: 29 MG/DL (ref 8–24)
CALCIUM SERPL-MCNC: 8.8 MG/DL (ref 8.5–10.1)
CHLORIDE SERPL-SCNC: 111 MMOL/L (ref 98–108)
CO2 SERPL-SCNC: 29 MMOL/L (ref 21–32)
CREAT SERPL-MCNC: 1.06 MG/DL (ref 0.6–1.2)
DEPRECATED RDW RBC AUTO: 58.4 FL (ref 35.1–46.3)
EOSINOPHIL # BLD AUTO: 0.04 K/MM3 (ref 0–0.68)
EOSINOPHIL NFR BLD AUTO: 0 % (ref 0–6)
ERYTHROCYTE [DISTWIDTH] IN BLOOD BY AUTOMATED COUNT: 18.6 % (ref 11.7–14.2)
GLOBULIN SER CALC-MCNC: 5.3 G/DL (ref 2.2–4)
GLUCOSE SERPL-MCNC: 109 MG/DL (ref 70–99)
HCT VFR BLD AUTO: 41.6 % (ref 37–53)
HGB BLD-MCNC: 13.5 G/DL (ref 13.5–17.5)
IMM GRANULOCYTES # BLD AUTO: 0.14 K/MM3 (ref 0–0.1)
IMM GRANULOCYTES NFR BLD AUTO: 1 % (ref 0–1)
LYMPHOCYTES # BLD AUTO: 0.71 K/MM3 (ref 0.84–5.2)
LYMPHOCYTES NFR BLD AUTO: 7 % (ref 21–46)
MCHC RBC AUTO-ENTMCNC: 32.5 G/DL (ref 31.5–36.5)
MCV RBC AUTO: 87 FL (ref 80–100)
MONOCYTES # BLD AUTO: 0.43 K/MM3 (ref 0.16–1.47)
MONOCYTES NFR BLD AUTO: 4 % (ref 4–13)
NEUTROPHILS # BLD AUTO: 9.56 K/MM3 (ref 1.96–9.15)
NEUTROPHILS NFR BLD AUTO: 87 % (ref 41–73)
NRBC # BLD AUTO: 0 K/MM3 (ref 0–0.02)
NRBC BLD AUTO-RTO: 0 /100 WBC (ref 0–0.2)
PLATELET # BLD AUTO: 249 K/MM3 (ref 150–400)
POTASSIUM SERPL-SCNC: 3.7 MMOL/L (ref 3.5–5.5)
PROT SERPL-MCNC: 7.1 G/DL (ref 6.4–8.2)
PROTHROMBIN TIME: 47.8 SEC (ref 9.7–11.5)
SODIUM SERPL-SCNC: 143 MMOL/L (ref 136–145)

## 2023-01-07 NOTE — NUR
SHIFT SUMMARY
 
NO CHANGES TO NEURO STATUS SINCE PREVIOUS NOTE. LS COARSE THROUGHOUT. PATIENT
HAS BEEN ON 8L VIA HFNC THROUGHOUT THIS SHIFT WITH SPO2 >90%. BP STABLE.
AFEBRILE. RR 26-28. SEE ASSESSMENT. PATIENT WITH SOFT BILATERAL WRIST
RESTRAINTS IN PLACE. REPOSITIONING Q2HRS. PATIENT OFFERED WATER FREQUENTLY
THROUGHOUT THIS SHIFT. PATIENT TOOK MEDS CRUSHED IN APPLESAUCE WELL. THIS RN
NOTED NEW PETECHIAE RASH ON BACK. SEE ASSESSMENT. PATIENT HAS BEEN RESISTANT
TO ROLLING FOR CARE DURING THIS SHIFT WHERE HE HAS PREVIOUS ASSISTED WITH
ROLLING ON PREVIOUS NIGHT FOR THIS RN. OTHERWISE PATIENT HAS BEEN COOPERATIVE
WITH MOST CARE. PATIENT WITH HOB ELEVATED. PRODUCTIVE FREQUENT COUGH NOTED,
HARSHER AND MORE FREQUENT THAN PREVIOUS NIGHTS FOR THIS RN. FREQUENT CHECKS
FOR SAFETY. BED ALARM AND CAMERA ON. BED IN LOWEST POSITION AND CALL LIGHT
WITHIN REACH. THIS RN WILL CONTINUE TO MONITOR UNTIL SHIFT CHANGE AT 0700.

## 2023-01-07 NOTE — NUR
SHIFT SUMMARY
 
PT ALERT PART OF DAY, RESPONDING TO SOME QUESTIONS, SOME OUT OF CONTEXT
SPEECH. NEEDING SWR TO MAINTAINS LINES/OXYGEN TUBING. DOES PULL AT LINES
STILL. ABLE TO TAKE OF RESTRAINTS WHEN SOMEONE IS IN ROOM TO REDIRECT. SON AND
DIL IN ROOM FOR ABOUT AN HOUR THIS EVENING TO BE WITH PT. S02>90% ON 6L HHFNC.
WEAK PRODUCTIVE COUGH. TELEMETRY SHOWS AFIB, HR MOSTLY 90'S-110'S. VSS. PT
INCONTINENT OF URINE. ATTENDS C/D/I, LINEN CHANGE THIS SHIFT. BED BATH GIVEN.
TYLENOL GIVEN PER PT STATE BACK ACHE. REPOSITIONED Q2H. ORAL CARE DONE X3. PT
HAD DIFFICULTY SWALLOWING BREAKFAST, SUCTIONED FOOD OUT OF MOUTH. SPEECH
THERAPY TO ASSESS, STATES HE WAS ABLE TO FOR THEM. TO WATCH PT AS A CASE BY
CASE BASIS FOR SWALLOWING. PT HAS RASH ON BACK, SEE PICTURES IN CHART. CALL
LIGHT IN REACH. PT RESTING IN ROOM.

## 2023-01-07 NOTE — NUR
PATIENT UPDATE
 
THIS RN SPOKE TO MD SHORT REGARDING CRITICAL INR OF 5.06. THIS RN UPDATED MD
SHORT OF NEW PETECHIAE RASH ON BACK. NO NEW ORDERS AT THIS TIME. THIS RN WILL
CONTINUE TO MONITOR UNTIL SHIFT CHANGE AT 0700.

## 2023-01-07 NOTE — NUR
ASSUMPTION OF CARE
 
THIS RN ASSUMED CARE OF PATIENT AT 1900. REPORT TAKEN FROM ALANNA COTTER. PATIENT
HAS BEEN ALERT AND ORIENTED TO SELF SO FAR THIS SHIFT. PATIENT ABLE TO MAKE
BASIC NEEDS KNOWN AT THIS TIME. ANSWERING SIMPLE QUESTIONS APPROPRIATLY.
PATIENT REMAINS IN SOFT BILATERAL WRIST RESTRAINTS AT THIS TIME TO PROTECT
LINES/CORDS. REDIRECTABLE AND COOPERATIVE WITH CARE. RESTRAINTS REMOVED WHEN
THIS RN IN THE ROOM. REPOSITIONING Q2HRS. OFFERING FLUIDS AND FOOD OFTEN.
PATIENT ALLOWING THIS RN TO DO ORAL CARE SO FAR. PATIENT WITH STABLE BP.
AFEBRILE. AFIB WITH HR 'S. SPO2 >92% ON 6L VIA HFNC. BED ALARM AND
CAMERA ON. FREQUENT CHECKS FOR SAFETY. BED IN LOWEST POSITION AND CALL LIGHT
WITHIN REACH. THIS RN WILL REVIEW CHART AND CONTINUE TO MONITOR AND PROVIDE
INTERVENTIONS AS NEEDED/ORDERED.

## 2023-01-08 LAB
ALBUMIN SERPL BCP-MCNC: 1.8 G/DL (ref 3.4–5)
ALBUMIN/GLOB SERPL: 0.3 {RATIO} (ref 0.8–1.8)
ALT SERPL W P-5'-P-CCNC: 22 U/L (ref 12–78)
ANION GAP SERPL CALCULATED.4IONS-SCNC: 3 MMOL/L (ref 6–16)
AST SERPL W P-5'-P-CCNC: 34 U/L (ref 12–37)
BILIRUB SERPL-MCNC: 1.8 MG/DL (ref 0.1–1)
BUN SERPL-MCNC: 26 MG/DL (ref 8–24)
CALCIUM SERPL-MCNC: 8.7 MG/DL (ref 8.5–10.1)
CHLORIDE SERPL-SCNC: 116 MMOL/L (ref 98–108)
CO2 SERPL-SCNC: 25 MMOL/L (ref 21–32)
CREAT SERPL-MCNC: 1.15 MG/DL (ref 0.6–1.2)
GLOBULIN SER CALC-MCNC: 5.2 G/DL (ref 2.2–4)
GLUCOSE SERPL-MCNC: 105 MG/DL (ref 70–99)
POTASSIUM SERPL-SCNC: 3.5 MMOL/L (ref 3.5–5.5)
PROT SERPL-MCNC: 7 G/DL (ref 6.4–8.2)
PROTHROMBIN TIME: 57.6 SEC (ref 9.7–11.5)
SODIUM SERPL-SCNC: 144 MMOL/L (ref 136–145)

## 2023-01-08 NOTE — NUR
ASSUMPTION OF CARE:
PATIENT IS CONFUSED ONLY ORIENTED TO SELF AT TIME REQUIRING RESTRAINTS
FOR SAFETY AND LINE MANAGEMENT.
PATIENT HEARTRATE HAS INCREASED FROM 100'S THIS AM 'S
PATIENT IS COARSE IN THE UPPER AND DIM IN THE BASES. SPO2 >92% ON 7L RR
18-24
ATTENDS IN PLACE, INCONTINENT. PLAN FOR BED BATH IF AVAILABLE.
RECIVING FLAGYL.
NOT SIGNIFICANT CHANGE FROM NIGHT SHIFT. WILL CONTINUE TO MONITOR. NO CONCERNS
FROM DAUGHTER PATIENT OR THIS WRITER.

## 2023-01-08 NOTE — NUR
SHIFT SUMMARY
 
NO CHANGES TO NEURO STATUS. SEE PREVIOUS NOTE. PATIENT CONTIUES TO NEED SBWR
WHEN STAFF/FAMILY ARE NOT IN THE ROOM. PATIENT ABLE TO BE REDIRECTED. PT
CONTINUES TO BE IN AFIB WITH HR 'S. ON 6L HFNC WITH SPO2 >90%. BP
STABLE. AFEBRILE. MEDS GIVEN CRUSHED WITH APPLESAUCE. PATIENT TOLERATED WELL.
EXPIRATORY WHEEZES HEARD IN THE UPPER LOBES OF THE LUNGS, COARSE LS
THROUGHOUT. PATIENT COOPERATIVE WITH CARE. CONTINUES TO BE REDIRECTABLE.
FREQUENT CHECKS FOR SAFETY AND TO ENSURE ATTENDS DRY. Q2HR TURNS. FLUIDS AND
FOOD BEING OFFERED WHEN PATIENT IS AWAKE. PATIENT DENIES PAIN. BED ALARM AND
CAMERA ON, SIDE RAILS UP. BED IN LOWEST POSITION AND CALL LIGHT WITHIN REACH.
THIS RN WILL CONTINUE TO MONITOR UNTIL SHIFT CHANGE AT 0700.

## 2023-01-08 NOTE — NUR
END OF SHIFT:
PATIENT STILL DENIES PAIN OF ANY KIND TO INCLUDE CHEST PAIN, DENIES SOB.
SPO2 HAS BEEN MAINTAINED AT 92-96% IS CURRENLTY ON 3.5L PATIENT WAS
HACKING AFTER THIN LIQUIDS AND TOLERATES NECTAR THICK LIQUIDS MUCH
BETTER. PATIENT HAS BEEN MOSTLY COMPLIANT WITH CARE BUT STILL PULLS OFF
O2 WHICH IS VERY NEEDED. AND PULLS AT IV, GOWN ETC. DID TOLERATE SOME
BREAKS OFF RESTRAINTS THROUGHOUT THE DAY. FAMILY AT BEDSIDE, RASH HAS NOT
WORSENED. PATIENT HAVING INCREASED APPETITE. LR ORDERED AT THE END OF
SHIFT DUE TO POOR ORAL INTAKE BY HOSPITALIST. WILL CONTINUE TO MONITOR
UNTIL SHIFT CHANGE. NO SIGN OF ACUTE DISTRESS.

## 2023-01-09 LAB
ANION GAP SERPL CALCULATED.4IONS-SCNC: 3 MMOL/L (ref 6–16)
BASOPHILS # BLD AUTO: 0.06 K/MM3 (ref 0–0.23)
BASOPHILS NFR BLD AUTO: 1 % (ref 0–2)
BUN SERPL-MCNC: 24 MG/DL (ref 8–24)
CALCIUM SERPL-MCNC: 8.7 MG/DL (ref 8.5–10.1)
CHLORIDE SERPL-SCNC: 118 MMOL/L (ref 98–108)
CO2 SERPL-SCNC: 27 MMOL/L (ref 21–32)
CREAT SERPL-MCNC: 1.2 MG/DL (ref 0.6–1.2)
DEPRECATED RDW RBC AUTO: 60.9 FL (ref 35.1–46.3)
EOSINOPHIL # BLD AUTO: 0.18 K/MM3 (ref 0–0.68)
EOSINOPHIL NFR BLD AUTO: 2 % (ref 0–6)
ERYTHROCYTE [DISTWIDTH] IN BLOOD BY AUTOMATED COUNT: 19.1 % (ref 11.7–14.2)
GLUCOSE SERPL-MCNC: 102 MG/DL (ref 70–99)
HCT VFR BLD AUTO: 42.1 % (ref 37–53)
HGB BLD-MCNC: 13.2 G/DL (ref 13.5–17.5)
IMM GRANULOCYTES # BLD AUTO: 0.16 K/MM3 (ref 0–0.1)
IMM GRANULOCYTES NFR BLD AUTO: 2 % (ref 0–1)
LYMPHOCYTES # BLD AUTO: 0.9 K/MM3 (ref 0.84–5.2)
LYMPHOCYTES NFR BLD AUTO: 9 % (ref 21–46)
MCHC RBC AUTO-ENTMCNC: 31.4 G/DL (ref 31.5–36.5)
MCV RBC AUTO: 89 FL (ref 80–100)
MONOCYTES # BLD AUTO: 0.43 K/MM3 (ref 0.16–1.47)
MONOCYTES NFR BLD AUTO: 4 % (ref 4–13)
NEUTROPHILS # BLD AUTO: 8.14 K/MM3 (ref 1.96–9.15)
NEUTROPHILS NFR BLD AUTO: 83 % (ref 41–73)
NRBC # BLD AUTO: 0 K/MM3 (ref 0–0.02)
NRBC BLD AUTO-RTO: 0 /100 WBC (ref 0–0.2)
PLATELET # BLD AUTO: 246 K/MM3 (ref 150–400)
POTASSIUM SERPL-SCNC: 3.7 MMOL/L (ref 3.5–5.5)
PROTHROMBIN TIME: 62.4 SEC (ref 9.7–11.5)
SODIUM SERPL-SCNC: 148 MMOL/L (ref 136–145)

## 2023-01-09 NOTE — NUR
ASSUMPTION OF CARE:
NEURO: PATIENT HAS BEEN COOPERATIVE WITH THIS RN, NIGHT RN'S HAD
DIFFICULTY WITH INCREASED PARANOIA, AGGITATION, AND CONFUSION. CURRENLTY
IS ALERT TO SELF. TIRED, STILL NEEDING SBWR DUE TO PULLING AT LINES, AND
O2. 
PULM: PATIENT IS NEEDING 5L CURRENLTY TO MAINTAIN SPO2 > 92. WILL
CONTINUE TO MONITOR AND TITRATE PER O2 DEMANDS. UNCOLLECTED SPUTUM SAMPLE
DUE TO PATIENT COOPERATION. 
CARDIAC: DENIES PAIN OF ANY KIND, VERY MINIMALLY HYPERTENSIVE. PROVIDERS
AWARE, ND IS 'S, IMPROVES WITH AM BETA BLOCKER.
GI/: PATIENT USES THE URINAL IN THE BED WHEN EVALUATED AND ASKED, DARK
DARK LILLIE URINE, SLIGHTLY IMPROVED WITH FLUIDS.
SKIN: TOLERATING SBWR WELL, NO SIGN OF BREAKDOWN DUE TO THESE, BREAKS
WITH CARE ARE PROVIDED. PATIENT NOT AGGRESSIVELY PULLING AT THIS TIME AT
RESTRAINT TOLERATING WELL. RASH ON BACK, WILL COTINUE TO MONITOR. 
CONCERNS: NO CONCERNS FROM THIS RN AT THIS TIME.

## 2023-01-09 NOTE — NUR
SHIFT SUMMARY
 
PT A&O TO SELF. AT START OF SHIFT PT WAS VERY CONFUSED, PARANOID, AND
AGITATED. PT REFUSED PO MEDICATION, WAS TRYING TO KNEE AND KICK STAFF.
PHYSICIAN NOTIFIED, ORDERS PLACED FOR 1mg IV ATIVAN. PT CALM AND COOPERATIVE
WITH CARE AFTER ADMINISTRATION. BILAT SWR REMAINED IN PLACE THROUGHOUT SHIFT.
VSS, BP STABLE, AFIB 's, UP 's WHEN PT WAS AGITATED. SpO2> 92% ON
2-4L VIA NC. PT HAD CONTINENT AND INCONTINENT VOIDS, ATTENDS IN PLACE. NO BM
THIS SHIFT. PT REMAINED IN BED. NO OTHER EVENTS, WILL REPORT TO ONCOMING RN.

## 2023-01-09 NOTE — NUR
TRANSFER/END OF SHIFT:
LUIS MIGUEL IS BEING TRANSFERED TO MED FLOOR 352, MED NO TELE. REPORT GIVEN
TO RECEIVING RN WITH NO FURTHER QUESTIONS COMMENTS OR CONCERNS. PATIENT
CHANGES FROM ASSUMPTION OF CARE ARE SPO2 AT 93 96% ON 3L WAS ABLE TO GET
OUT OF BED WITH 2 PERSON MAX ASSIST FWW AND GAIT BELT. PATIENT WITH BED
BATH TODAY, PCT. STILL DENIES CHEST PAIN/PRESSURE. NO SIGN OF ACUTE
DISTRESS. PATIENT FAMILY INFORMED, CHART AND BELONGING SENT WITH PATIENT.
NO CONCERNS FROM THIS RN AT THIS TIME.

## 2023-01-10 LAB — PROTHROMBIN TIME: 67.9 SEC (ref 9.7–11.5)

## 2023-01-10 NOTE — NUR
SHIFT SUMMARY
PT AXO TO SELF, FAMILY AND FOLLOWING DIRECTIONS THOUGH IS STILL CONFUSED. VSS.
PT UP OOB WITH 2 ASSIST, FWW AND GB. RESTRAINTS DC'D THIS SHIFT. BED ALARM ON,
BED IN LOW POSITION, CALL LIGHT WITHIN REACH. PT ON 3L VIA NC.

## 2023-01-10 NOTE — NUR
LAB CALLED CRITICAL INR 7.37. NOTIFIED DR. JENNINGS. HE STATED HE WOULD EVALUATE
PATIENT THIS MORNING AND JUST TO CONTINUE TO MONITOR IF NO ACTIVE BLEEDING
PRESENT.

## 2023-01-10 NOTE — NUR
SUMMARY - PATIENT CONFUSED OVERNIGHT. ONLY ORIENTED TO SELF. PLEASANT WITH
STAFF BUT HE DOES REMOVE AND ATTEMPT TO PULL OUT IV, REOMVE O2 AND FREQUNTLY
REMOVES BREIF AND BLANKETS. BILATERAL SOFT WRIST RESTRAINTS IN PLACE OVERNGIHT
TO PROTECT LINES AND PREVENT PATIENT FROM FALLING OUT OF BED. PATIENT WAS
INCONTINENT AND CONTINENT OVERNIGHT. URINE VERY DARK LILLIE. ENCOURAGED
THICKENED WATER FREQUENTLY TO PATIENT. TURNED PATIENT IN BED [id number].

## 2023-01-11 LAB
ANION GAP SERPL CALCULATED.4IONS-SCNC: 2 MMOL/L (ref 6–16)
BUN SERPL-MCNC: 27 MG/DL (ref 8–24)
CALCIUM SERPL-MCNC: 8.4 MG/DL (ref 8.5–10.1)
CHLORIDE SERPL-SCNC: 120 MMOL/L (ref 98–108)
CO2 SERPL-SCNC: 29 MMOL/L (ref 21–32)
CREAT SERPL-MCNC: 1.26 MG/DL (ref 0.6–1.2)
GLUCOSE SERPL-MCNC: 104 MG/DL (ref 70–99)
POTASSIUM SERPL-SCNC: 4.3 MMOL/L (ref 3.5–5.5)
PROTHROMBIN TIME: 59.7 SEC (ref 9.7–11.5)
SODIUM SERPL-SCNC: 151 MMOL/L (ref 136–145)

## 2023-01-11 NOTE — NUR
Pt was admitted with acute hypoxemic resp failure, and suspected
aspiration causing increased cough. Pt's son was at bedside this afternoon,
and after speaking to [facility] and [facility], he is electing home with hospice
but has not chosen an agency yet. Attempted to give pt choice letter, but he
seems to have left for the evening. Left voicemail, no return call.
  Spoke to Clarita, Care Manager who left pt choice letter for son and plans to
finish the conversation tomorrow.

## 2023-01-11 NOTE — NUR
SUMMARY - PATIENT CONFUSED OVERNIGHT. ONLY ORIENTED TO SELF AND FAMILY.
PLEASANT WITH STAFF BUT HE DOES REMOVE O2 AND PULLED AN IV. PATIENT WAS
INCONTINENT AND CONTINENT OVERNIGHT. PATIENT MORE ALERT OVERNIGHT AND WAS
TURNING HIMSELF INDEPENDENTLY IN BED. OCCASIONALLY TRYING TO GET UP BUT EASILY
REDIRECTED BACK INTO BED. VSS. NO ACUTE MEDICAL EVENTS. IV ABX GIVEN.

## 2023-01-11 NOTE — NUR
SUMMARY- PT ALERT TO SELF AND FAMILY. IMPULSIVE, FREQ ATTEMPTS TO GET OOB
UNATTENDED DESPITE FREQ TEACHING OF CALL LIGHT AND SAFETY. MULT CALLS RELATED
TO VIDIO MONITORING. PT AMBULATED IN ROOM WITH PHYS THER. TODAY, GOT UP TO [facility]
TO VOID. AND HAD BM THIS AM. MEAL SET UP AND ASSIST RELATED TO ASP RISK.
SPEECH PLACED PT ON PUREE PER PT REQ AND ALSO THICK LIQ. DR JENNINGS AWARE OF
CONT ELEVATION OF INR AND HOLDING OFF ON DISCHARGE UNTIL THIS RANGE IS STABLE.
ALSO GIVING FAMILY TIME TO GET ALL OF EQUIPTMENT THEY WILL NEED FOR HOME DC.
FAMILY ALSO AGREED TO HH/HOSPICE, [facility] DC 1/12/23

## 2023-01-12 LAB
ANION GAP SERPL CALCULATED.4IONS-SCNC: 2 MMOL/L (ref 6–16)
BUN SERPL-MCNC: 19 MG/DL (ref 8–24)
CALCIUM SERPL-MCNC: 8.4 MG/DL (ref 8.5–10.1)
CHLORIDE SERPL-SCNC: 113 MMOL/L (ref 98–108)
CO2 SERPL-SCNC: 27 MMOL/L (ref 21–32)
CREAT SERPL-MCNC: 1.05 MG/DL (ref 0.6–1.2)
GLUCOSE SERPL-MCNC: 98 MG/DL (ref 70–99)
POTASSIUM SERPL-SCNC: 4.1 MMOL/L (ref 3.5–5.5)
PROTHROMBIN TIME: 24.8 SEC (ref 9.7–11.5)
SODIUM SERPL-SCNC: 142 MMOL/L (ref 136–145)

## 2023-01-12 NOTE — NUR
SUMMARY- PT ALERT TO SELF AND FAMILY. UP IN CHAIR FOR MEALS. TOLERATING FOOD
AND FLUID WITH SUPERVISION RELATED TO ASP RISK. PT OCC IMPULSIVE AND
FORGETFUL, SET OFF CHAIR AND BED ALARM APPROX 8 TIMES TODAY, ABLE TO GET TO
HIM IN TIME TO HELP. ALSO VIVIAN DUNLAP AIDING IN ALERTING STAFF TO PT EXIT
ATTEMPTS. NO FALLS. PT AMBULATES TO BATHROOM SBA WITH WALKER. VOIDS IN TOILET.
PLAN TO DC HOME WITH HOME [facility]ALTH H[facility]. [facility] BACKED UP UNTIL SAT FOR
EVAL, SO DC IS ON HOLD UNTIL SET UP.

## 2023-01-12 NOTE — NUR
SUMMARY - PATIENT CONFUSED OVERNIGHT. ONLY ORIENTED TO SELF AND FAMILY.
PLEASANT WITH STAFF BUT HE DOES REMOVE O2 AND PULLED AN IV. PLACED PATIENT ON
CONT PULSE OX TO ALERT STAFF WHEN HE REMOVE HIS CANNULA. PATIENT WAS CONTINENT
OVERNIGHT. PATIENT TURNING HIMSELF INDEPENDENTLY IN BED. OCCASIONALLY TRYING
TO GET UP SITTING AT EDGE OF BED, BUT EASILY REDIRECTED BACK INTO BED. VSS. NO
ACUTE MEDICAL EVENTS. IV ABX GIVEN.

## 2023-01-12 NOTE — NUR
NOTE RELATED TO [facility]- DR PARRIS LEMUS CALLED RN AT 1730 STATED HE
JUST SPOKE WITH PT'S WIFE WHO IS NOT WILLING TO HAVE [facility], THE PT'S SON STAY IN
THE HOUSE TO HELP WITH PT'S STAY. I WILL PASS ON WORD FOR  TO LOOK
INTO OTHER POSSIBILITIES AS MEMORY CARE AS PT LIKELY NOT REHABABLE RELATED TO
COGNITIVE IMPAIRMENT. WILL RELAY MESSAGE TO CHARGE RN, NIGHT RN AND CASE
MANAGER.

## 2023-01-13 LAB
ANION GAP SERPL CALCULATED.4IONS-SCNC: 4 MMOL/L (ref 6–16)
BUN SERPL-MCNC: 16 MG/DL (ref 8–24)
CALCIUM SERPL-MCNC: 8.3 MG/DL (ref 8.5–10.1)
CHLORIDE SERPL-SCNC: 112 MMOL/L (ref 98–108)
CO2 SERPL-SCNC: 26 MMOL/L (ref 21–32)
CREAT SERPL-MCNC: 1.14 MG/DL (ref 0.6–1.2)
GLUCOSE SERPL-MCNC: 95 MG/DL (ref 70–99)
POTASSIUM SERPL-SCNC: 4.1 MMOL/L (ref 3.5–5.5)
PROTHROMBIN TIME: 16.7 SEC (ref 9.7–11.5)
SODIUM SERPL-SCNC: 142 MMOL/L (ref 136–145)

## 2023-01-13 NOTE — NUR
SHIFT SUMMARY:
 
NO ACUTE EVENTS. RESTRAINTS IN PLACE (SOFT WRIST BILATERALLY, FARRUKH VEST, 4
SIDE RAILS WHILE IN BED, REMOTE MONITORING). HAS BEEN MILDLY AGITATED, TRYING
TO GET OOB, PULLING ON LINES, PULLING AGAINST RESTRAINTS; BEHAVIORS NOT SEVERE
ENOUGH TO WARRANT ADMINISTRATION OF SEDATING MEDICATIONS. AMBULATED IN HALLWAY
NEAR ROOM X 2 WITH 1 PERSON, GAIT BELT, AND FWW. UP IN CHAIR PART OF SHIFT.
TOLERATING PO INTAKE.  O2 @ 4 L/MIN HF NC, ON CONT OXIMETRY, DESATS TO 84-86%
WHEN HE TAKES OFF NC, REQUIRES SEVERAL MINUTES TO RECOVER AFTER ACTIVITY.

## 2023-01-13 NOTE — NUR
ALERT TO SELF ONLY. CONFUSED AND ANXIOUS. IMPULSIVE; FARRUKH VEST INITIATED AND
NEW (1X) ORDER FOR SEROQUEL 25 MG GIVEN; NO NOTICIBLE EFFECT ON AGITATION.
CALLED AGAIN FOR SOFT WRISTS /SIDE RAILSX4 TO BE ADDED.
CONT. PULSE OXIMETRY; SPO2 90 TO 94% ON 4L NC. DESAT TO 84% ON ROOM AIR (WAS
REMOVING HIS NC). TOOK PILLS WELL (WHOLE) WITH APPLESAUCE.
SLEEP PROMOTED. CALL LIGHT IN REACH; ENCOURAGED TO MAKE NEEDS KNOWN. BED ALARM
SET. Bedside and Verbal shift change report given to LUANNE Kelly RN (oncoming nurse) by Butch Lynn RN (offgoing nurse). Report included the following information SBAR, Procedure Summary, Intake/Output and MAR.

## 2023-01-14 LAB — PROTHROMBIN TIME: 17.1 SEC (ref 9.7–11.5)

## 2023-01-14 NOTE — NUR
SHIFT SUMMARY:
 
NO ACUTE EVENTS. OXYGEN TITRATED DOWN TO 2 L/MIN NC WITH SATURATIONS 89-92%.
BREATH SOUNDS VERY DIM, OCC NP COUGH. STILL SOME HARMAN, BUT RECOVERY TIME IS
SHORTER. OOB WITH 1 PERSON, GAIT BELT, AND FWW; GAIT IS WEAK, CAN ONLY GO
SHORT DISTANCE. TOOK A SHOWER AND SHAVED WITH ASSISTANCE. SON AND DIL VISITED
TODAY, WATCHED A FOOTBALL GAME WITH HIM. SON IS HOPING SNF PLACEMENT CAN BE
FOUND THIS WEEK, BUT PHYSICAL THERAPY IS RECOMMENDING HOME WITH HOME HEALTH.

## 2023-01-14 NOTE — NUR
NEAR CONSTANT ATTEMPTS TO EXIT BED (UNRELATED TO URINATION NEEDS). ANXIOUS,
ALERT TO SELF ONLY. REMOVING CONT. PULSE OX FREQUENTLY. ATTEMPTING TO REMOVE
IV SITE DRESSING OCCASIONALLY.
 
FARRUKH VEST INITIATED AT 2200. PULSE OX SENSOR PLACED ON TOE. PRN ZYPREXA 5MG
GIVEN FOR ANXIETY/ AGITATION. LIGHTS DOWN /TV OFF/ SLEEP ENCOURAGED. CALL
LIGHT IN REACH. FREQUENT ROUNDING /ASSESSMENT OF NEEDS.

## 2023-01-14 NOTE — NUR
ALERT TO SELF ONLY. CONFUSED AND ANXIOUS.
IMPULSIVE; POSEY VEST, SOFT WRIST RESTRAINTS, AND SIDE RAILSX4 FOR PATIENT
SAFETY.
1X ASSIST TRANSFER WTH WALKER.
SPO2 90 TO 94% ON 4L NC. DESAT TO 84% WITH COUGHING.
DOES BETTER WITH PILLS CRUSHED WITH APPLESAUCE.
SLEEP PROMOTED. CALL LIGHT IN REACH; ENCOURAGED TO MAKE NEEDS KNOWN. BED ALARM
SET.

## 2023-01-15 LAB — PROTHROMBIN TIME: 20 SEC (ref 9.7–11.5)

## 2023-01-15 NOTE — NUR
SHIFT SUMMARY- PT IS ALERT, PLESANT AND COOPERATIVE. HE IS IN A POSEY. HE IS
IMPULSIVE AND HAS A DIFFICULT TIME FOLLOWING DIRECTIONS. HE WAS UP TO THE
CHAIR MOST OF THIS MORNING AND SLEPT MUCH OF THIS AFTERNOON. HE IS EATING AND
DRINKING WELL. FAMILY WAS AT BEDSIDE THIS AFTERNOON. HIS BED IS IN THE LOW
POSITION AND CALL LIGHT IS WITIN REACH.

## 2023-01-16 LAB
ANION GAP SERPL CALCULATED.4IONS-SCNC: 5 MMOL/L (ref 6–16)
BUN SERPL-MCNC: 13 MG/DL (ref 8–24)
CALCIUM SERPL-MCNC: 8.7 MG/DL (ref 8.5–10.1)
CHLORIDE SERPL-SCNC: 110 MMOL/L (ref 98–108)
CO2 SERPL-SCNC: 26 MMOL/L (ref 21–32)
CREAT SERPL-MCNC: 1.17 MG/DL (ref 0.6–1.2)
GLUCOSE SERPL-MCNC: 91 MG/DL (ref 70–99)
POTASSIUM SERPL-SCNC: 4 MMOL/L (ref 3.5–5.5)
PROTHROMBIN TIME: 22.3 SEC (ref 9.7–11.5)
SODIUM SERPL-SCNC: 141 MMOL/L (ref 136–145)

## 2023-01-16 NOTE — NUR
NO ACUTE CHANGES FROM LAST NIGHT SHIFT. ALERT TO SELF ONLY.
RESTLESS AND ANXIOUS WITH ATTEMPTS TO EXIT BED FREQUENTLY. POSEY VEST IN
PLACE.

## 2023-01-16 NOTE — NUR
DISCHARGE NOTE
 
PT DISCHARGED TO HOME WITH HOME HEALTH.  PT'S NEIGHBORS, CAITLIN AND ABIODUN,
ARRIVED TO PICK HIM UP AND TAKE HIM HOME.  HOME O2 WAS COMPLETED AND PT SENT
HOME WITH O2.  IV WAS REMOVED SUCCESSFULLY.  DISCHARGE EDUCATION CONDUCTED
WITH NEIGHBORS PRESENT.  MEDICATIONS FAXED TO THE PHARMACY OF HIS CHOICE.
EDUCATION PROVIDED.

## 2023-01-23 ENCOUNTER — HOSPITAL ENCOUNTER (OUTPATIENT)
Dept: HOSPITAL 95 - LAB SHORT | Age: 83
Discharge: HOME | End: 2023-01-23
Attending: FAMILY MEDICINE
Payer: MEDICARE

## 2023-01-23 DIAGNOSIS — E03.9: ICD-10-CM

## 2023-01-23 DIAGNOSIS — I48.92: ICD-10-CM

## 2023-01-23 DIAGNOSIS — I50.9: ICD-10-CM

## 2023-01-23 DIAGNOSIS — I11.0: Primary | ICD-10-CM

## 2023-01-23 DIAGNOSIS — E78.2: ICD-10-CM

## 2023-01-23 LAB
ALBUMIN SERPL BCP-MCNC: 2.5 G/DL (ref 3.4–5)
ALBUMIN/GLOB SERPL: 0.5 {RATIO} (ref 0.8–1.8)
ALT SERPL W P-5'-P-CCNC: 26 U/L (ref 12–78)
ANION GAP SERPL CALCULATED.4IONS-SCNC: 6 MMOL/L (ref 6–16)
AST SERPL W P-5'-P-CCNC: 24 U/L (ref 12–37)
BASOPHILS # BLD AUTO: 0.05 K/MM3 (ref 0–0.23)
BASOPHILS NFR BLD AUTO: 1 % (ref 0–2)
BILIRUB SERPL-MCNC: 1.1 MG/DL (ref 0.1–1)
BUN SERPL-MCNC: 9 MG/DL (ref 8–24)
CALCIUM SERPL-MCNC: 9 MG/DL (ref 8.5–10.1)
CHLORIDE SERPL-SCNC: 105 MMOL/L (ref 98–108)
CHOLEST SERPL-MCNC: 120 MG/DL (ref 50–200)
CHOLEST/HDLC SERPL: 2.9 {RATIO}
CO2 SERPL-SCNC: 26 MMOL/L (ref 21–32)
CREAT SERPL-MCNC: 1.25 MG/DL (ref 0.6–1.2)
DEPRECATED RDW RBC AUTO: 68.1 FL (ref 35.1–46.3)
EOSINOPHIL # BLD AUTO: 0.26 K/MM3 (ref 0–0.68)
EOSINOPHIL NFR BLD AUTO: 5 % (ref 0–6)
ERYTHROCYTE [DISTWIDTH] IN BLOOD BY AUTOMATED COUNT: 20.6 % (ref 11.7–14.2)
GLOBULIN SER CALC-MCNC: 5.3 G/DL (ref 2.2–4)
GLUCOSE SERPL-MCNC: 96 MG/DL (ref 70–99)
HCT VFR BLD AUTO: 43.6 % (ref 37–53)
HDLC SERPL-MCNC: 42 MG/DL (ref 39–?)
HGB BLD-MCNC: 13.8 G/DL (ref 13.5–17.5)
IMM GRANULOCYTES # BLD AUTO: 0.01 K/MM3 (ref 0–0.1)
IMM GRANULOCYTES NFR BLD AUTO: 0 % (ref 0–1)
LDLC SERPL CALC-MCNC: 62 MG/DL (ref 0–110)
LDLC/HDLC SERPL: 1.5 {RATIO}
LYMPHOCYTES # BLD AUTO: 1.03 K/MM3 (ref 0.84–5.2)
LYMPHOCYTES NFR BLD AUTO: 21 % (ref 21–46)
MCHC RBC AUTO-ENTMCNC: 31.7 G/DL (ref 31.5–36.5)
MCV RBC AUTO: 92 FL (ref 80–100)
MONOCYTES # BLD AUTO: 0.42 K/MM3 (ref 0.16–1.47)
MONOCYTES NFR BLD AUTO: 9 % (ref 4–13)
NEUTROPHILS # BLD AUTO: 3.09 K/MM3 (ref 1.96–9.15)
NEUTROPHILS NFR BLD AUTO: 64 % (ref 41–73)
NRBC # BLD AUTO: 0 K/MM3 (ref 0–0.02)
NRBC BLD AUTO-RTO: 0 /100 WBC (ref 0–0.2)
PLATELET # BLD AUTO: 187 K/MM3 (ref 150–400)
POTASSIUM SERPL-SCNC: 4 MMOL/L (ref 3.5–5.5)
PROT SERPL-MCNC: 7.8 G/DL (ref 6.4–8.2)
PROTHROMBIN TIME: 17.4 SEC (ref 9.7–11.5)
SODIUM SERPL-SCNC: 137 MMOL/L (ref 136–145)
TRIGL SERPL-MCNC: 78 MG/DL (ref 30–160)
TSH SERPL DL<=0.005 MIU/L-ACNC: 3.62 UIU/ML (ref 0.36–4.8)
VLDLC SERPL CALC-MCNC: 15 MG/DL (ref 6–32)